# Patient Record
Sex: MALE | Race: WHITE | NOT HISPANIC OR LATINO | Employment: FULL TIME | URBAN - METROPOLITAN AREA
[De-identification: names, ages, dates, MRNs, and addresses within clinical notes are randomized per-mention and may not be internally consistent; named-entity substitution may affect disease eponyms.]

---

## 2020-02-18 ENCOUNTER — OFFICE VISIT - RIVER FALLS (OUTPATIENT)
Dept: FAMILY MEDICINE | Facility: CLINIC | Age: 35
End: 2020-02-18

## 2022-02-12 VITALS
SYSTOLIC BLOOD PRESSURE: 124 MMHG | DIASTOLIC BLOOD PRESSURE: 80 MMHG | BODY MASS INDEX: 33.08 KG/M2 | WEIGHT: 237.2 LBS | TEMPERATURE: 98.7 F | HEART RATE: 91 BPM | OXYGEN SATURATION: 98 % | RESPIRATION RATE: 14 BRPM

## 2022-02-16 NOTE — NURSING NOTE
Comprehensive Intake Entered On:  2/18/2020 10:04 AM CST    Performed On:  2/18/2020 10:00 AM CST by Sole Lewis CMA               Summary   Chief Complaint :   c/o cough, chest congestion, SOB since yesterday. Cough present for 5 days.    Weight Measured :   237.2 lb(Converted to: 237 lb 3 oz, 107.59 kg)    Systolic Blood Pressure :   124 mmHg   Diastolic Blood Pressure :   80 mmHg   Mean Arterial Pressure :   95 mmHg   Peripheral Pulse Rate :   91 bpm   BP Site :   Right arm   BP Method :   Manual   HR Method :   Electronic   Temperature Tympanic :   98.7 DegF(Converted to: 37.1 DegC)    Respiratory Rate :   14 br/min   Oxygen Saturation :   98 %   Sole Lewis CMA - 2/18/2020 10:00 AM CST   Health Status   Allergies Verified? :   Yes   Medication History Verified? :   Yes   Pre-Visit Planning Status :   N/A   Tobacco Use? :   Current every day smoker   Sole Lewis CMA - 2/18/2020 10:00 AM CST   Consents   Consent for Immunization Exchange :   Consent Granted   Consent for Immunizations to Providers :   Consent Granted   Sole Lewis CMA - 2/18/2020 10:00 AM CST   Meds / Allergies   (As Of: 2/18/2020 10:04:59 AM CST)   Allergies (Active)   No Known Medication Allergies  Estimated Onset Date:   Unspecified ; Created By:   Sole Lewis CMA; Reaction Status:   Active ; Category:   Drug ; Substance:   No Known Medication Allergies ; Type:   Allergy ; Updated By:   Sole Lewis CMA; Reviewed Date:   2/18/2020 10:03 AM CST        Medication List   (As Of: 2/18/2020 10:04:59 AM CST)

## 2022-02-16 NOTE — PROGRESS NOTES
Chief Complaint    c/o cough, chest congestion, SOB since yesterday. Cough present for 5 days.  History of Present Illness      Patient is a 34-year-old male who comes in complaining of cough for 5 days.      He has had a productive cough for 5 days and has chest congestion and some dyspnea.       He was ice fishing over the weekend with a friend who was just diagnosed with pneumonia.        He did start with his cough before he left for the fishing trip.        He did have chills the first couple of days but right now he has no fevers or chills.  No myalgias although he does have a bit of back pain.        Positive sore throat.        No ear pain, no nasal congestion, no headache.        No history of asthma or inhaler use.  Review of Systems      Negative except as listed in HPI.  Physical Exam   Vitals & Measurements    T: 98.7   F (Tympanic)  HR: 91(Peripheral)  RR: 14  BP: 124/80  SpO2: 98%     WT: 237.2 lb       Vitals noted and within normal limits.      Patient is alert, oriented and in no acute distress.      Eyes: conjunctiva not injected.      Ears: canals patent, TMs intact, no erythema and no bulging      Mouth: mucous membranes pink and moist, pharynx not erythematous      Neck is supple with no lymphadenopathy      Heart: regular rate and rhythm with no murmur      Lungs: clear to auscultation bilaterally      CXR no infiltrate  Assessment/Plan       Acute URI (J06.9)         otc meds        fluids        rest        Follow up if not improving as anticipated.                 Cough (R05)         Ordered:          XR Chest PA/Lat (Request), Cough  Dyspnea                Dyspnea (R06.00)         Ordered:          XR Chest PA/Lat (Request), Cough  Dyspnea           Patient Information     Name:ROBERT OLIVER JED      Address:      [address] [place] [id number]     Sex:Male     YOB: 1985     [phone]     Emergency Contact:ELLIOT OLIVER     MRN:[id number]      FIN:[id number]     Location:[facility]     Date of Service:02/18/2020      Primary Care Physician:       Rikki Antunez MD, [phone]      Attending Physician:       Lala Cope MD, [phone]  Problem List/Past Medical History    Ongoing     Obesity    Historical     No qualifying data  Medications   No active medications  Allergies    No Known Medication Allergies  Social History    Smoking Status - 02/18/2020     Current every day smoker     Alcohol - Current, 07/14/2010      Current, 12/18/2013      Current, 1-2 times per week, 07/14/2010     Employment/School      Employed, 12/18/2013     Exercise - Does not exercise, 07/14/2010      Exercise frequency: Never., 12/18/2013     Home/Environment      Marital status: ., 12/18/2013     Sexual      Sexually active: Yes., 12/18/2013     Substance Abuse - Past, 07/14/2010     Tobacco - Current, 07/14/2010  Family History    HTN - Hypertension: Father.    Mother: History is negative    Sister: History is negative    Brother: History is negative    Brother: History is negative  Immunizations      Vaccine Date Status          meningococcal conjugate vaccine 07/18/2006 Recorded          Td 04/02/2004 Recorded          Hep B 09/28/1998 Recorded          Hep B 07/23/1998 Recorded          Hep B 08/06/1997 Recorded          OPV 08/13/1993 Recorded          DTaP 08/13/1993 Recorded          MMR (measles/mumps/rubella) 08/13/1993 Recorded          OPV 06/26/1989 Recorded          DTaP 06/26/1989 Recorded          MMR (measles/mumps/rubella) 01/18/1989 Recorded          DTaP 04/12/1988 Recorded          OPV 02/12/1988 Recorded          DTaP 02/12/1988 Recorded          OPV 1987 Recorded          DTaP 1987 Recorded

## 2022-04-12 ENCOUNTER — TELEPHONE (OUTPATIENT)
Dept: FAMILY MEDICINE | Facility: CLINIC | Age: 37
End: 2022-04-12

## 2022-04-12 ENCOUNTER — VIRTUAL VISIT (OUTPATIENT)
Dept: FAMILY MEDICINE | Facility: CLINIC | Age: 37
End: 2022-04-12
Payer: COMMERCIAL

## 2022-04-12 DIAGNOSIS — U07.1 INFECTION DUE TO 2019 NOVEL CORONAVIRUS: Primary | ICD-10-CM

## 2022-04-12 PROBLEM — E66.9 OBESITY: Status: ACTIVE | Noted: 2022-04-12

## 2022-04-12 PROCEDURE — 99213 OFFICE O/P EST LOW 20 MIN: CPT | Mod: 95 | Performed by: FAMILY MEDICINE

## 2022-04-12 NOTE — PROGRESS NOTES
Meño is a 36 year old who is being evaluated via a billable video visit.      How would you like to obtain your AVS? Declined  If the video visit is dropped, the invitation should be resent by: Text to cell phone:   [phone]  Will anyone else be joining your video visit? No      Video Start Time: 2 PM    Assessment & Plan     Infection due to 2019 novel coronavirus  Patient seems to have acute Covid.  He is having significant symptoms.  His main risk factor is being obese.  He wants to take medication I have gone over the side effect risk.  He is on no other medications  - nirmatrelvir and ritonavir (PAXLOVID) therapy pack; Take 3 tablets by mouth in the morning and 3 tablets in the evening. Do all this for 5 days.    6}     If he gets any worse he should be seen    No follow-ups on file.    Orion Thomas MD  [facility]    Subjective   Meño is a 36 year old who presents for the following health issues     HPI        COVID-19 Symptom Review  How many days ago did these symptoms start? 1 day ago, Tested positive this am on rapid, has pcr test pending through [facility].    Are any of the following symptoms significant for you?    New or worsening difficulty breathing? No    Worsening cough? Yes, I am coughing up mucus.    Fever or chills? Yes, the highest temperature was 100+    Headache: YES    Sore throat: YES    Chest pain: Congestion    Diarrhea: no    Body aches? YES    What treatments has patient tried? Acetaminophen and alkaseltzer   Does patient live in a nursing home, group home, or shelter? no  Does patient have a way to get food/medications during quarantined? Yes, I have a friend or family member who can help me.                 MASSBP Score 4/12/2022   Age Greater than or equal to 65 years 0   BMI greater than or equal to 35 kg/m2 2   Has Diabetes Mellitus 0   Has Chronic Kidney Disease 0   Has Cardiovascular Disease and 55 years or older 0   Has Chronic Respiratory  Disease and 55 years or older 0   Has Hypertension and 55 years or older 0   Is Immunocompromised 0   Is Pregnant 0   Member of BIPOC community (Black/, /, ,  or , or  or [place] Native)  0   MASSBP Score 2   Has the patient had a positive COVID test outside our system?  Yes   What day did symptoms start?  4/11/2022         Review of Systems         Objective           Vitals:  No vitals were obtained today due to virtual visit.    Physical Exam   GENERAL: Healthy, alert and no distress  EYES: Eyes grossly normal to inspection.  No discharge or erythema, or obvious scleral/conjunctival abnormalities.  RESP: No audible wheeze, cough, or visible cyanosis.  No visible retractions or increased work of breathing.  Obvious congestion  SKIN: Visible skin clear. No significant rash, abnormal pigmentation or lesions.  NEURO: Cranial nerves grossly intact.  Mentation and speech appropriate for age.  PSYCH: Mentation appears normal, affect normal/bright, judgement and insight intact, normal speech and appearance well-groomed.                Video-Visit Details    Type of service:  Video Visit    Video End Time:225    Originating Location (pt. Location): Home    Distant Location (provider location):  [facility]     Platform used for Video Visit: [employer]

## 2022-04-12 NOTE — TELEPHONE ENCOUNTER
Pt stated he took a home Covid test and is positive. Stated his sxs began yesterday. Has congestion, fever, chills, sweats, headache. Pt became tearful and stated his dad  6 weeks ago from covid. Pt would like to receive antivirals or monoclonal infusion. Pt was transferred to scheduling for video visit to discuss covid and treatments.

## 2022-07-05 ENCOUNTER — OFFICE VISIT (OUTPATIENT)
Dept: FAMILY MEDICINE | Facility: CLINIC | Age: 37
End: 2022-07-05
Payer: COMMERCIAL

## 2022-07-05 VITALS
WEIGHT: 234.5 LBS | DIASTOLIC BLOOD PRESSURE: 87 MMHG | SYSTOLIC BLOOD PRESSURE: 138 MMHG | TEMPERATURE: 98.3 F | BODY MASS INDEX: 32.71 KG/M2 | HEART RATE: 85 BPM

## 2022-07-05 DIAGNOSIS — Z13.6 SCREENING FOR CARDIOVASCULAR CONDITION: ICD-10-CM

## 2022-07-05 DIAGNOSIS — R03.0 ELEVATED BLOOD PRESSURE READING WITHOUT DIAGNOSIS OF HYPERTENSION: Primary | ICD-10-CM

## 2022-07-05 LAB
ANION GAP SERPL CALCULATED.3IONS-SCNC: 9 MMOL/L (ref 7–15)
BUN SERPL-MCNC: 10.1 MG/DL (ref 6–20)
CALCIUM SERPL-MCNC: 9.4 MG/DL (ref 8.6–10)
CHLORIDE SERPL-SCNC: 104 MMOL/L (ref 98–107)
CHOLEST SERPL-MCNC: 237 MG/DL
CREAT SERPL-MCNC: 1.13 MG/DL (ref 0.67–1.17)
DEPRECATED HCO3 PLAS-SCNC: 25 MMOL/L (ref 22–29)
GFR SERPL CREATININE-BSD FRML MDRD: 87 ML/MIN/1.73M2
GLUCOSE SERPL-MCNC: 100 MG/DL (ref 70–99)
HDLC SERPL-MCNC: 34 MG/DL
LDLC SERPL CALC-MCNC: 151 MG/DL
NONHDLC SERPL-MCNC: 203 MG/DL
POTASSIUM SERPL-SCNC: 4.8 MMOL/L (ref 3.4–5.3)
SODIUM SERPL-SCNC: 138 MMOL/L (ref 136–145)
TRIGL SERPL-MCNC: 261 MG/DL

## 2022-07-05 PROCEDURE — 80048 BASIC METABOLIC PNL TOTAL CA: CPT | Performed by: FAMILY MEDICINE

## 2022-07-05 PROCEDURE — 80061 LIPID PANEL: CPT | Performed by: FAMILY MEDICINE

## 2022-07-05 PROCEDURE — 99214 OFFICE O/P EST MOD 30 MIN: CPT | Performed by: FAMILY MEDICINE

## 2022-07-05 PROCEDURE — 36415 COLL VENOUS BLD VENIPUNCTURE: CPT | Performed by: FAMILY MEDICINE

## 2022-07-05 ASSESSMENT — LIFESTYLE VARIABLES
AUDIT-C TOTAL SCORE: 5
HOW MANY STANDARD DRINKS CONTAINING ALCOHOL DO YOU HAVE ON A TYPICAL DAY: 1 OR 2
SKIP TO QUESTIONS 9-10: 0
HOW OFTEN DO YOU HAVE SIX OR MORE DRINKS ON ONE OCCASION: LESS THAN MONTHLY
HOW OFTEN DO YOU HAVE A DRINK CONTAINING ALCOHOL: 4 OR MORE TIMES A WEEK

## 2022-07-05 NOTE — PROGRESS NOTES
Assessment & Plan     Elevated blood pressure reading without diagnosis of hypertension  Discussed the importance of establishing if he has hypertension.  He will monitor his numbers at home.  Information has been given.  Advised on weight loss, heart healthy diet, increased activity, and smoking cessation.  - Lipid panel reflex to direct LDL Fasting; Future  - Basic metabolic panel  (Ca, Cl, CO2, Creat, Gluc, K, Na, BUN); Future    Screening for cardiovascular condition  Lipid panel and BMP ordered today and will follow up when numbers are available.  - Lipid panel reflex to direct LDL Fasting; Future  - Basic metabolic panel  (Ca, Cl, CO2, Creat, Gluc, K, Na, BUN); Future             Tobacco Cessation:   reports that he has been smoking cigarettes. He has been smoking about 0.50 packs per day. He has never used smokeless tobacco.  Tobacco Cessation Action Plan: Self help information given to patient        Return in about 1 month (around 8/5/2022).    Rikki Antunez MD  [facility]    Sherman Wilder is a 36 year old, presenting for the following health issues:  Blood Pressure Check (Discuss elevated diastolic reading during health assessment done at work.  Also wants fasting blood work done.)      History of Present Illness       Reason for visit:  Blood pressure and cholesterol    He eats 0-1 servings of fruits and vegetables daily.He consumes 2 sweetened beverage(s) daily.He exercises with enough effort to increase his heart rate 9 or less minutes per day.  He exercises with enough effort to increase his heart rate 3 or less days per week.   He is taking medications regularly.    Discuss elevated blood pressure reading done at work while doing a health assessment.  He reports the diastolic reading was elevated.  Did have a Mountain Dew Kick Start prior to having BP taken.      Patient also is fasting for blood work.     H/o HTN in the family  denies any symptoms related to  HTN      Review of Systems         Objective    /87 (BP Location: Right arm, Cuff Size: Adult Large)   Pulse 85   Temp 98.3  F (36.8  C) (Tympanic)   Wt 106.4 kg (234 lb 8 oz)   BMI 32.71 kg/m    Body mass index is 32.71 kg/m .  Physical Exam                       .  ..

## 2022-07-24 ENCOUNTER — HEALTH MAINTENANCE LETTER (OUTPATIENT)
Age: 37
End: 2022-07-24

## 2022-08-12 ENCOUNTER — OFFICE VISIT (OUTPATIENT)
Dept: FAMILY MEDICINE | Facility: CLINIC | Age: 37
End: 2022-08-12
Payer: COMMERCIAL

## 2022-08-12 VITALS
BODY MASS INDEX: 32.64 KG/M2 | DIASTOLIC BLOOD PRESSURE: 89 MMHG | SYSTOLIC BLOOD PRESSURE: 130 MMHG | WEIGHT: 234 LBS | HEART RATE: 93 BPM | TEMPERATURE: 99 F

## 2022-08-12 DIAGNOSIS — L60.0 INGROWN TOENAIL OF LEFT FOOT WITH INFECTION: Primary | ICD-10-CM

## 2022-08-12 PROCEDURE — 90715 TDAP VACCINE 7 YRS/> IM: CPT | Performed by: PHYSICIAN ASSISTANT

## 2022-08-12 PROCEDURE — 90471 IMMUNIZATION ADMIN: CPT | Performed by: PHYSICIAN ASSISTANT

## 2022-08-12 PROCEDURE — 11750 EXCISION NAIL&NAIL MATRIX: CPT | Mod: TA | Performed by: PHYSICIAN ASSISTANT

## 2022-08-12 RX ORDER — CEPHALEXIN 500 MG/1
500 CAPSULE ORAL 3 TIMES DAILY
Qty: 30 CAPSULE | Refills: 0 | Status: SHIPPED | OUTPATIENT
Start: 2022-08-12

## 2022-08-12 ASSESSMENT — ENCOUNTER SYMPTOMS
FEVER: 0
COLOR CHANGE: 1
PARESTHESIAS: 0
CHILLS: 0
NUMBNESS: 0

## 2022-08-12 NOTE — PROGRESS NOTES
Assessment & Plan     Ingrown toenail of left foot with infection  Treated today. Local cares Follow-up prn  - cephALEXin (KEFLEX) 500 MG capsule  Dispense: 30 capsule; Refill: 0                   No follow-ups on file.    SILKE Giron  [facility]    Sherman Wilder is a 36 year old, presenting for the following health issues:  Nail Problem (Ingrown Toenail)      36-year-old presents with left medial great toe pain he has developed an ingrown nail he has tried to manipulate it but has had more pain now he has more redness which is worse today than it has been the last couple of days mild drainage he has had ingrown toenails previously and has had nail avulsion x1 previously    History of Present Illness       Reason for visit:  Ingrown possibly infected toe nail  Symptom onset:  1-3 days ago  Symptoms include:  Pain, redness, and some swelling on my toe  Symptom intensity:  Moderate  Symptom progression:  Staying the same  Had these symptoms before:  Yes  Has tried/received treatment for these symptoms:  Yes  Previous treatment was successful:  Yes  Prior treatment description:  Cut off part of my toe nail  What makes it worse:  No  What makes it better:  Tylenol, hot water soak    He eats 0-1 servings of fruits and vegetables daily.He consumes 2 sweetened beverage(s) daily.He exercises with enough effort to increase his heart rate 20 to 29 minutes per day.  He exercises with enough effort to increase his heart rate 4 days per week.   He is taking medications regularly.             Review of Systems   Constitutional: Negative for chills and fever.   Musculoskeletal: Positive for gait problem.   Skin: Positive for color change.   Neurological: Negative for numbness and paresthesias.            Objective    There were no vitals taken for this visit.  There is no height or weight on file to calculate BMI.  Physical Exam great toe was swollen and erythematous ingrown nail with  granulation tissue on the medial border.  Verbal informed consent carried out risk of bleeding infection pain he elected to proceed with a partial nail avulsion toe was cleansed well with Betadine  Digital block using a total of 5 cc 1% plain lidocaine was administered and a turnicot was placed  After appropriate pause and anesthesia a [place] blade was used to free the nail from the nailbed English anvil was used to split the nail medially. Curved hemostat used to remove the offending portion of the nail.  Tornicot removed. No bleeding noted. Tolerated  well and no complications cover with cephalexin local cares he will reevaluate as needed                   .  ..

## 2022-10-03 ENCOUNTER — HEALTH MAINTENANCE LETTER (OUTPATIENT)
Age: 37
End: 2022-10-03

## 2022-10-04 ENCOUNTER — E-VISIT (OUTPATIENT)
Dept: FAMILY MEDICINE | Facility: CLINIC | Age: 37
End: 2022-10-04
Payer: COMMERCIAL

## 2022-10-04 ENCOUNTER — NURSE TRIAGE (OUTPATIENT)
Dept: NURSING | Facility: CLINIC | Age: 37
End: 2022-10-04
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
## 2022-10-04 DIAGNOSIS — S70.362A TICK BITE OF LEFT THIGH, INITIAL ENCOUNTER: Primary | ICD-10-CM

## 2022-10-04 PROCEDURE — 99421 OL DIG E/M SVC 5-10 MIN: CPT | Performed by: FAMILY MEDICINE

## 2022-10-04 NOTE — TELEPHONE ENCOUNTER
Nurse Triage SBAR    Situation:   Tick bite    Background:   -Patient calling, It is okay to leave a detailed message at this number.  -He was last in the woods on Sunday morning.    Assessment:   -He noticed the bite arround 1630 - there is a red moisés about one inch wide with a bullseye.  -Tender to the touch   -No HA   -No fever  -No joint pain   -No widespread rash    Protocol Recommended Disposition:   See PCP Within 24 Hours    Recommendation:   E-vist or VV  FNA instructed patient how to set up the virtual    FNA RN called patient back to verify that the E-Visit worked, and that he didn't need to be set up with a VV at this time,     POORNIMA GARCIA RN on 10/4/2022 at 5:38 PM    Reason for Disposition    [1] Red or very tender (to touch) area AND [2] started over 24 hours after the bite    Additional Information    Negative: Sounds like a life-threatening emergency to the triager    Negative: Not a tick bite    Negative: [1] 2 to 14 days following tick bite AND [2] severe headache with fever occurs    Negative: [1] 2 to 14 days following tick bite AND [2] widespread rash with fever occurs    Negative: Patient sounds very sick or weak to the triager    Negative: [1] Fever AND [2] area is very tender to touch    Negative: [1] Red streak or red line AND [2] length > 2 inches (5 cm)    Negative: Can't remove live tick (after trying Care Advice)    Negative: [1] Fever AND [2] spreading red area or streak    Negative: [1] 2 to 14 days following tick bite AND [2] fever AND [3] no rash or headache    Negative: [1] 2 to 14 days following tick bite AND [2] widespread rash or headache AND [3] no fever    Protocols used: TICK BITE-A-

## 2022-10-05 ENCOUNTER — TELEPHONE (OUTPATIENT)
Dept: NURSING | Facility: CLINIC | Age: 37
End: 2022-10-05
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
## 2022-10-05 RX ORDER — DOXYCYCLINE 100 MG/1
200 CAPSULE ORAL ONCE
Qty: 2 CAPSULE | Refills: 0 | Status: SHIPPED | OUTPATIENT
Start: 2022-10-05 | End: 2022-10-05

## 2022-10-05 NOTE — TELEPHONE ENCOUNTER
Nurse Triage SBAR    Is this a 2nd Level Triage? YES, LICENSED PRACTITIONER REVIEW IS REQUIRED    Situation:  Did E-Visit yesterday, for tick bite. Hasn't heard back. Deer tick bite. Waiting on antibiotics.    Background: Patient,Meño, calling. Consent: not needed.    Assessment:  Patient is requesting antibiotics for deer tick bite, doxycycline. Did E-visit and never heard back from anyone last night.  Pharmacy: [facility].    Protocol Recommended Disposition: Home care/ Telephone Advise    Recommendation: According to the protocol, Patient should wait for a call back after speaking to provider. Advised Patient to wait for a call back after we speak to the provider. Care advice given. Patient verbalizes understanding and agrees with plan of care. Reviewed concerning symptoms and when to call back and patient verbalized understanding and agreed to follow care advice given.       Lorrie King RN  [facility] Nurse Advisor  10/5/2022 at 2:57 PM

## 2022-10-05 NOTE — TELEPHONE ENCOUNTER
Call back to patient.    I advised him that his script for doxycycline was sent to his pharmacy.      Lorrie King RN  [facility] Nurse Advisor  10/5/2022 at 4:03 PM

## 2023-08-13 ENCOUNTER — HEALTH MAINTENANCE LETTER (OUTPATIENT)
Age: 38
End: 2023-08-13

## 2024-10-05 ENCOUNTER — HEALTH MAINTENANCE LETTER (OUTPATIENT)
Age: 39
End: 2024-10-05